# Patient Record
Sex: MALE | Race: OTHER | Employment: FULL TIME | URBAN - METROPOLITAN AREA
[De-identification: names, ages, dates, MRNs, and addresses within clinical notes are randomized per-mention and may not be internally consistent; named-entity substitution may affect disease eponyms.]

---

## 2024-04-14 ENCOUNTER — HOSPITAL ENCOUNTER (EMERGENCY)
Age: 29
Discharge: HOME | End: 2024-04-14
Attending: EMERGENCY MEDICINE

## 2024-04-14 VITALS
SYSTOLIC BLOOD PRESSURE: 118 MMHG | WEIGHT: 135 LBS | OXYGEN SATURATION: 99 % | HEART RATE: 64 BPM | RESPIRATION RATE: 18 BRPM | TEMPERATURE: 97.8 F | BODY MASS INDEX: 23.92 KG/M2 | DIASTOLIC BLOOD PRESSURE: 69 MMHG | HEIGHT: 63 IN

## 2024-04-14 DIAGNOSIS — S05.01XA ABRASION OF RIGHT CONJUNCTIVA, INITIAL ENCOUNTER: Primary | ICD-10-CM

## 2024-04-14 PROCEDURE — 99283 EMERGENCY DEPT VISIT LOW MDM: CPT

## 2024-04-14 PROCEDURE — 6370000000 HC RX 637 (ALT 250 FOR IP): Performed by: EMERGENCY MEDICINE

## 2024-04-14 RX ORDER — NAPROXEN 250 MG/1
250 TABLET ORAL 2 TIMES DAILY WITH MEALS
Qty: 10 TABLET | Refills: 0 | Status: SHIPPED | OUTPATIENT
Start: 2024-04-14 | End: 2024-04-19

## 2024-04-14 RX ORDER — ERYTHROMYCIN 5 MG/G
OINTMENT OPHTHALMIC ONCE
Status: COMPLETED | OUTPATIENT
Start: 2024-04-14 | End: 2024-04-14

## 2024-04-14 RX ORDER — ACETAMINOPHEN 500 MG
1000 TABLET ORAL ONCE
Status: COMPLETED | OUTPATIENT
Start: 2024-04-14 | End: 2024-04-14

## 2024-04-14 RX ORDER — NAPROXEN 250 MG/1
250 TABLET ORAL ONCE
Status: COMPLETED | OUTPATIENT
Start: 2024-04-14 | End: 2024-04-14

## 2024-04-14 RX ORDER — ACETAMINOPHEN 500 MG
1000 TABLET ORAL 3 TIMES DAILY
Qty: 30 TABLET | Refills: 0 | Status: SHIPPED | OUTPATIENT
Start: 2024-04-14 | End: 2024-04-19

## 2024-04-14 RX ORDER — ERYTHROMYCIN 5 MG/G
OINTMENT OPHTHALMIC 4 TIMES DAILY
Qty: 3.5 G | Refills: 0 | Status: SHIPPED | OUTPATIENT
Start: 2024-04-14 | End: 2024-04-19

## 2024-04-14 RX ADMIN — ERYTHROMYCIN: 5 OINTMENT OPHTHALMIC at 03:44

## 2024-04-14 RX ADMIN — NAPROXEN 250 MG: 250 TABLET ORAL at 03:44

## 2024-04-14 RX ADMIN — ACETAMINOPHEN 1000 MG: 500 TABLET ORAL at 03:44

## 2024-04-14 ASSESSMENT — PAIN DESCRIPTION - ORIENTATION: ORIENTATION: RIGHT

## 2024-04-14 ASSESSMENT — PAIN - FUNCTIONAL ASSESSMENT: PAIN_FUNCTIONAL_ASSESSMENT: 0-10

## 2024-04-14 ASSESSMENT — PAIN SCALES - GENERAL
PAINLEVEL_OUTOF10: 5
PAINLEVEL_OUTOF10: 7

## 2024-04-14 ASSESSMENT — LIFESTYLE VARIABLES
HOW OFTEN DO YOU HAVE A DRINK CONTAINING ALCOHOL: MONTHLY OR LESS
HOW MANY STANDARD DRINKS CONTAINING ALCOHOL DO YOU HAVE ON A TYPICAL DAY: 1 OR 2

## 2024-04-14 ASSESSMENT — PAIN DESCRIPTION - LOCATION: LOCATION: EYE

## 2024-04-14 ASSESSMENT — PAIN DESCRIPTION - PAIN TYPE: TYPE: ACUTE PAIN

## 2024-04-14 NOTE — DISCHARGE INSTRUCTIONS
You have been provided with printed prescriptions.    Apply the prescription ointment to your right eye 4 times per day (morning, afternoon, evening, and just before bedtime) for the next 5 days.    Be sure to contact the clinic listed in your paperwork to arrange for a follow up visit.    If you have any new or worsening issues after going home, or if you are not getting better quickly with time, don't hesitate to return here for reevaluation at any time 24/7!    - - - - - - - - - - - - - - - - - - - -    Se le solis proporcionado recetas impresas.    Aplique el ungüento recetado en huynh meg derecho 4 veces al día (mañana, tarde, noche y yvette antes de acostarse) jorgito los próximos 5 días.    Asegúrese de comunicarse con la clínica que figura en huynh documentación para programar ángel visita de seguimiento.    Si tiene algún problema nuevo o que empeora después de regresar a casa, o si no mejora rápidamente con el tiempo, ¡no dude en regresar aquí para ángel reevaluación en cualquier momento las 24 horas, los 7 días de la semana!

## 2024-04-27 ASSESSMENT — VISUAL ACUITY: OU: 1

## 2024-04-27 ASSESSMENT — ENCOUNTER SYMPTOMS
PHOTOPHOBIA: 0
EYE DISCHARGE: 1
EYE PAIN: 1

## 2024-04-27 ASSESSMENT — TONOMETRY: OD_IOP_MMHG: 20

## 2024-04-27 NOTE — ED PROVIDER NOTES
[facility] EMERGENCY DEPARTMENT    Name: Pascual Benton : 1995 MRN: [id number] Date of Service: 2024    Initial VS: BP: 118/69, Temp: 97.8 °F (36.6 °C), Pulse: 64, Respirations: 18, SpO2: 99 %     History obtained with assistance from a Georgian .    CC: eye irritation    HPI: this patient is a 29 y.o. male presenting to the ED from home. Mr. Benton tells me that this past afternoon he was working underneath of a car.  As he was doing this he was lying supine on the ground looking upwards of the cars under body.  As he was cleaning a car part some debris seem to fall and get into his right eye.  He felt a foreign body sensation to his right eye and then immediately immediately developed pretty intense pain and irritation to his right eye.  He went inside and rinsed his eye copiously and repeatedly with tap water.  This seemed to help his pain very quickly and significantly.  In the hours since then, however, he has continued to feel non-severe irritation and burning-like pain to his right eye.  He has also noticed that his eye watering excessively.  He was primarily concerned that there may still be some debris in his eye so he came to this department this morning to be evaluated.  He has not appreciated any other changes from his usual state of health and he denies other current complaints.  Specifically, he has not noticed any associated changes in his vision.  He does not wear contact lenses.  _____________________________________________________________________    Past Medical History:   Diagnosis Date    Patient denies chronic medical problems       Past Surgical History:   Procedure Laterality Date    HERNIA REPAIR       Social History     Tobacco Use    Smoking status: Never    Smokeless tobacco: Never   Vaping Use    Vaping Use: Never used   Substance Use Topics    Alcohol use: Yes    Drug use: Never